# Patient Record
Sex: FEMALE | NOT HISPANIC OR LATINO | URBAN - METROPOLITAN AREA
[De-identification: names, ages, dates, MRNs, and addresses within clinical notes are randomized per-mention and may not be internally consistent; named-entity substitution may affect disease eponyms.]

---

## 2019-12-11 ENCOUNTER — APPOINTMENT (RX ONLY)
Dept: URBAN - METROPOLITAN AREA CLINIC 12 | Facility: CLINIC | Age: 79
Setting detail: DERMATOLOGY
End: 2019-12-11

## 2019-12-11 DIAGNOSIS — Z41.1 ENCOUNTER FOR COSMETIC SURGERY: ICD-10-CM

## 2019-12-11 PROCEDURE — ? PATIENT SPECIFIC COUNSELING

## 2019-12-11 PROCEDURE — ? CONSULTATION - AGING FACE

## 2019-12-11 ASSESSMENT — LOCATION ZONE DERM: LOCATION ZONE: FACE

## 2019-12-11 ASSESSMENT — LOCATION SIMPLE DESCRIPTION DERM: LOCATION SIMPLE: LEFT FOREHEAD

## 2019-12-11 ASSESSMENT — LOCATION DETAILED DESCRIPTION DERM: LOCATION DETAILED: LEFT INFERIOR MEDIAL FOREHEAD

## 2019-12-11 NOTE — PROCEDURE: CONSULTATION - AGING FACE
Detail Level: Detailed
Send Procedure Quote As Charge: No
Consultation Charge $ (Use Numbers Only, No Text Please.): 120.00

## 2019-12-11 NOTE — PROCEDURE: PATIENT SPECIFIC COUNSELING
Other (Free Text): *Bilateral upper lid ptosis \\n\\nPatient presents for consultation lower face and upper eyelid. Patient states she has had a face lift and lower lid blepharoplasty 20+ years ago.\\n\\nFace/neck- excellent candidate for lower face and neck lift, recommended adding a little fat to plump up her cheeks \\n\\nEyes-  advised against doing her lower lids due to her prior surgeries.  however recommended a ptosis repair with fat repositioning to help with the hollowing. \\n\\nSkin- excellent candidate for croton oil peel or lasers to help with the fine lines and wrinkles around the mouth and and lower eye lids. Recommended doing the chemical peel  during surgery. \\n\\nHair- recommended doing a little hair grafting around the front hair line to help frame her face \\n\\nExplained that surgery is done at the outpatient surgery center. \\nDiscussed non surgical options ( ultherapy)
Detail Level: Zone